# Patient Record
Sex: MALE | Race: WHITE | URBAN - METROPOLITAN AREA
[De-identification: names, ages, dates, MRNs, and addresses within clinical notes are randomized per-mention and may not be internally consistent; named-entity substitution may affect disease eponyms.]

---

## 2022-08-09 ENCOUNTER — OFFICE VISIT (OUTPATIENT)
Dept: URBAN - METROPOLITAN AREA CLINIC 30 | Facility: CLINIC | Age: 42
End: 2022-08-09
Payer: COMMERCIAL

## 2022-08-09 DIAGNOSIS — H00.011 STYE OF RIGHT UPPER EYELID: ICD-10-CM

## 2022-08-09 DIAGNOSIS — H00.012 STYE OF RIGHT LOWER EYELID: Primary | ICD-10-CM

## 2022-08-09 PROCEDURE — 92285 EXTERNAL OCULAR PHOTOGRAPHY: CPT | Performed by: OPHTHALMOLOGY

## 2022-08-09 PROCEDURE — 99204 OFFICE O/P NEW MOD 45 MIN: CPT | Performed by: OPHTHALMOLOGY

## 2022-08-09 NOTE — IMPRESSION/PLAN
Impression: Stye of right lower eyelid: H00.012. Plan: Discussed conservative treatment with observation, lid scrubs, and compresses vs steroid injection vs incision and drainage. Pt opted for observation with warm compresses and lid scrubs. RTC 3 months or sooner PRN for possible incision and drainage with kenalog injection.